# Patient Record
Sex: MALE | Race: WHITE | NOT HISPANIC OR LATINO | Employment: FULL TIME | URBAN - METROPOLITAN AREA
[De-identification: names, ages, dates, MRNs, and addresses within clinical notes are randomized per-mention and may not be internally consistent; named-entity substitution may affect disease eponyms.]

---

## 2019-03-01 ENCOUNTER — HOSPITAL ENCOUNTER (EMERGENCY)
Facility: HOSPITAL | Age: 64
Discharge: LEFT BEFORE DISCHARGE | End: 2019-03-01
Attending: EMERGENCY MEDICINE
Payer: COMMERCIAL

## 2019-03-01 VITALS
HEART RATE: 86 BPM | SYSTOLIC BLOOD PRESSURE: 122 MMHG | DIASTOLIC BLOOD PRESSURE: 74 MMHG | RESPIRATION RATE: 20 BRPM | TEMPERATURE: 98 F | OXYGEN SATURATION: 98 %

## 2019-03-01 DIAGNOSIS — E87.1 HYPONATREMIA: Primary | ICD-10-CM

## 2019-03-01 DIAGNOSIS — I95.9 HYPOTENSION: ICD-10-CM

## 2019-03-01 LAB
ANION GAP SERPL CALC-SCNC: 12 MMOL/L
BASOPHILS # BLD AUTO: 0.03 K/UL
BASOPHILS NFR BLD: 0.3 %
BUN SERPL-MCNC: 13 MG/DL
CALCIUM SERPL-MCNC: 10 MG/DL
CHLORIDE SERPL-SCNC: 91 MMOL/L
CO2 SERPL-SCNC: 22 MMOL/L
CREAT SERPL-MCNC: 1.1 MG/DL
DIFFERENTIAL METHOD: ABNORMAL
EOSINOPHIL # BLD AUTO: 0 K/UL
EOSINOPHIL NFR BLD: 0 %
ERYTHROCYTE [DISTWIDTH] IN BLOOD BY AUTOMATED COUNT: 14 %
EST. GFR  (AFRICAN AMERICAN): >60 ML/MIN/1.73 M^2
EST. GFR  (NON AFRICAN AMERICAN): >60 ML/MIN/1.73 M^2
GLUCOSE SERPL-MCNC: 103 MG/DL
HCT VFR BLD AUTO: 41 %
HGB BLD-MCNC: 14.4 G/DL
IMM GRANULOCYTES # BLD AUTO: 0.05 K/UL
IMM GRANULOCYTES NFR BLD AUTO: 0.4 %
LYMPHOCYTES # BLD AUTO: 0.6 K/UL
LYMPHOCYTES NFR BLD: 4.7 %
MCH RBC QN AUTO: 35.3 PG
MCHC RBC AUTO-ENTMCNC: 35.1 G/DL
MCV RBC AUTO: 101 FL
MONOCYTES # BLD AUTO: 0.8 K/UL
MONOCYTES NFR BLD: 7.1 %
NEUTROPHILS # BLD AUTO: 10.3 K/UL
NEUTROPHILS NFR BLD: 87.5 %
NRBC BLD-RTO: 0 /100 WBC
PLATELET # BLD AUTO: 316 K/UL
PMV BLD AUTO: 9.3 FL
POTASSIUM SERPL-SCNC: 4.4 MMOL/L
RBC # BLD AUTO: 4.08 M/UL
SODIUM SERPL-SCNC: 125 MMOL/L
TROPONIN I SERPL DL<=0.01 NG/ML-MCNC: <0.006 NG/ML
WBC # BLD AUTO: 11.81 K/UL

## 2019-03-01 PROCEDURE — 84484 ASSAY OF TROPONIN QUANT: CPT

## 2019-03-01 PROCEDURE — 99284 EMERGENCY DEPT VISIT MOD MDM: CPT | Mod: ,,, | Performed by: EMERGENCY MEDICINE

## 2019-03-01 PROCEDURE — 93010 ELECTROCARDIOGRAM REPORT: CPT | Mod: ,,, | Performed by: INTERNAL MEDICINE

## 2019-03-01 PROCEDURE — 93010 EKG 12-LEAD: ICD-10-PCS | Mod: ,,, | Performed by: INTERNAL MEDICINE

## 2019-03-01 PROCEDURE — 93005 ELECTROCARDIOGRAM TRACING: CPT

## 2019-03-01 PROCEDURE — 80048 BASIC METABOLIC PNL TOTAL CA: CPT

## 2019-03-01 PROCEDURE — 99284 PR EMERGENCY DEPT VISIT,LEVEL IV: ICD-10-PCS | Mod: ,,, | Performed by: EMERGENCY MEDICINE

## 2019-03-01 PROCEDURE — 99284 EMERGENCY DEPT VISIT MOD MDM: CPT

## 2019-03-01 PROCEDURE — 85025 COMPLETE CBC W/AUTO DIFF WBC: CPT

## 2019-03-01 PROCEDURE — 25000003 PHARM REV CODE 250: Performed by: PHYSICIAN ASSISTANT

## 2019-03-01 RX ORDER — SODIUM CHLORIDE 9 MG/ML
1000 INJECTION, SOLUTION INTRAVENOUS
Status: COMPLETED | OUTPATIENT
Start: 2019-03-01 | End: 2019-03-01

## 2019-03-01 RX ADMIN — SODIUM CHLORIDE 1000 ML: 0.9 INJECTION, SOLUTION INTRAVENOUS at 10:03

## 2019-03-01 NOTE — ED PROVIDER NOTES
Encounter Date: 3/1/2019       History     Chief Complaint   Patient presents with    Hypotension     64-year-old male with hypertension and tobacco use presents for hypotension.  Patient is visiting from [place], he was at the airport this morning flying back home when he started to feel fatigued and lightheaded.  Per paramedics, patient was diaphoretic and pale with blood pressure of 70/30.  He reports that symptoms improved after sitting down and now have completely resolved.  Denies ever having chest pain, shortness of breath, leg swelling or palpitations.  Denies recent fevers, abdominal pain, nausea/vomiting, diarrhea or urinary symptoms. States that he has not been sleeping well and did not eat or drink today which he suspects prompted his symptoms. No history of DVT/PE, no recent immobilization, long travel was 2 hour flight from [place].  Denies any known cardiac history, no history of prior similar episodes.          Review of patient's allergies indicates:  No Known Allergies  History reviewed. No pertinent past medical history.  History reviewed. No pertinent surgical history.  History reviewed. No pertinent family history.  Social History     Tobacco Use    Smoking status: Current Every Day Smoker     Packs/day: 1.00     Years: 20.00     Pack years: 20.00     Types: Cigarettes    Smokeless tobacco: Never Used   Substance Use Topics    Alcohol use: Yes     Alcohol/week: 1.8 oz     Types: 3 Cans of beer per week    Drug use: No     Review of Systems   Constitutional: Positive for diaphoresis and fatigue. Negative for chills and fever.   Eyes: Negative for photophobia and visual disturbance.   Respiratory: Negative for cough and shortness of breath.    Cardiovascular: Negative for chest pain, palpitations and leg swelling.   Gastrointestinal: Negative for abdominal pain, constipation, diarrhea, nausea and vomiting.   Endocrine: Negative for polydipsia and polyuria.   Genitourinary: Negative for dysuria  and hematuria.   Musculoskeletal: Negative for back pain and myalgias.   Skin: Positive for pallor. Negative for color change.   Neurological: Positive for light-headedness. Negative for dizziness, tremors, seizures, syncope, facial asymmetry, speech difficulty, weakness, numbness and headaches.       Physical Exam     Initial Vitals [03/01/19 0728]   BP Pulse Resp Temp SpO2   114/79 85 15 97.8 °F (36.6 °C) 98 %      MAP       --         Vitals:    03/01/19 1015   BP: 122/74   Pulse: 86   Resp: 20   Temp: 98       Physical Exam    Nursing note and vitals reviewed.  Constitutional: He appears well-developed and well-nourished. He is not diaphoretic. No distress.   HENT:   Head: Normocephalic and atraumatic.   Eyes: EOM are normal. Pupils are equal, round, and reactive to light.   Neck: Normal range of motion. Neck supple.   Cardiovascular: Normal rate, regular rhythm, normal heart sounds and intact distal pulses. Exam reveals no gallop and no friction rub.    No murmur heard.  Pulmonary/Chest: Breath sounds normal. No respiratory distress. He has no wheezes. He has no rhonchi. He has no rales. He exhibits no tenderness.   Abdominal: Soft. Bowel sounds are normal. He exhibits no distension and no mass. There is no tenderness. There is no rebound and no guarding.   Musculoskeletal: Normal range of motion.   Neurological: He is alert and oriented to person, place, and time.   Skin: Skin is warm and dry. Capillary refill takes more than 3 seconds.   Fingertips appear dusky and cyanotic   Psychiatric: He has a normal mood and affect.         ED Course   Procedures  Labs Reviewed   CBC W/ AUTO DIFFERENTIAL - Abnormal; Notable for the following components:       Result Value    RBC 4.08 (*)      (*)     MCH 35.3 (*)     Gran # (ANC) 10.3 (*)     Immature Grans (Abs) 0.05 (*)     Lymph # 0.6 (*)     Gran% 87.5 (*)     Lymph% 4.7 (*)     All other components within normal limits   BASIC METABOLIC PANEL - Abnormal;  Notable for the following components:    Sodium 125 (*)     Chloride 91 (*)     CO2 22 (*)     All other components within normal limits   TROPONIN I   TROPONIN I     EKG Readings: (Independently Interpreted)   Initial Reading: No STEMI. Rhythm: Normal Sinus Rhythm. Heart Rate: 87. Ectopy: No Ectopy. Conduction: Normal. ST Segments: Normal ST Segments. T Waves: Normal. Clinical Impression: Normal Sinus Rhythm     ECG Results          EKG 12-lead (In process)  Result time 03/01/19 08:08:23    In process by Interface, Lab In [facility] (03/01/19 08:08:23)                 Narrative:    Test Reason : (Not Selected)    Vent. Rate : 087 BPM     Atrial Rate : 087 BPM     P-R Int : 144 ms          QRS Dur : 090 ms      QT Int : 382 ms       P-R-T Axes : 066 077 070 degrees     QTc Int : 459 ms    Normal sinus rhythm  Normal ECG  No previous ECGs available    Referred By: System System           Confirmed By:                             Imaging Results    None          Medical Decision Making:   History:   Old Medical Records: I decided to obtain old medical records.  Independently Interpreted Test(s):   I have ordered and independently interpreted EKG Reading(s) - see prior notes  Clinical Tests:   Lab Tests: Reviewed and Ordered  Medical Tests: Ordered and Reviewed       APC / Resident Notes:   64-year-old male presenting for resolved hypotension.  Patient received approximately 300 mL of normal saline en route from EMS.  Differential diagnosis includes orthostatic hypotension, ACS, dehydration electrolyte derangement.  Will check labs, continue to give fluids and reassess.    Labs notable for hyponatremia with sodium of 125.  Discussed this result with the patient.  Given his electrolyte abnormality as well as high risk for ACS, discussed observation admission with the patient.  He refuses admission and would like to sign out AMA without having 2nd troponin checked.  Discussed at length with the patient about risks of  leaving AMA, including but not limited to MI, permanent organ damage, disability or death.  Patient expressed understanding and still would like to leave.  I believe he is of sound mind and has decision-making capacity.  AMA form signed. Stressed the importance of follow-up, strict ED return precautions given.  Patient voiced understanding. I discussed this patient with my supervising physician.    Clara Salvador PA-C             Attending Attestation:     Physician Attestation Statement for NP/PA:   I have conducted a face to face encounter with this patient in addition to the NP/PA, due to Medical Complexity    Other NP/PA Attestation Additions:    History of Present Illness: 65 y/o M reportly no medical problems presents from airport after near syncopal episode. SBP at seen per report 70s. Pt states he was on his way home after working all week in [place]. Pt states he barely slept because of noise in the hotel and he wok up at approx 4 to go to airport. No chest pain. No SOB. At time of exam asymptomatic. Denies significant etoh use.   Physical Exam: aao  nad  rrr  cta bialt  No le swelling   Medical Decision Making: ecg without ischemic changes.   Hydrate with IVF bolus  Plan of ACS rule out with serial tni  Sodium of 125, will therefore admit for rule out and correction of sodium- no prior blood work on pt as he is from [place].  Pt refusing admission. States he needs to get home. Pt aao and competent to make medical decisions. He expresses understanding risks of leaving AMA and chooses to leave AMA.                     Clinical Impression:       ICD-10-CM ICD-9-CM   1. Hyponatremia E87.1 276.1   2. Hypotension I95.9 458.9         Disposition:   Disposition: AMA  Condition: Stable        Clara Salvador PA-C  03/01/19 1056       Adriana Ray MD  03/01/19 1952

## 2019-03-01 NOTE — ED PROVIDER NOTES
Encounter Date: 3/1/2019       History     Chief Complaint   Patient presents with    Hypotension     HPI  Review of patient's allergies indicates:  No Known Allergies  History reviewed. No pertinent past medical history.  History reviewed. No pertinent surgical history.  History reviewed. No pertinent family history.  Social History     Tobacco Use    Smoking status: Current Every Day Smoker     Packs/day: 1.00     Years: 20.00     Pack years: 20.00     Types: Cigarettes    Smokeless tobacco: Never Used   Substance Use Topics    Alcohol use: Yes     Alcohol/week: 1.8 oz     Types: 3 Cans of beer per week    Drug use: No     Review of Systems    Physical Exam     Initial Vitals [03/01/19 0728]   BP Pulse Resp Temp SpO2   114/79 85 15 97.8 °F (36.6 °C) 98 %      MAP       --         Physical Exam    ED Course   Procedures  Labs Reviewed   CBC W/ AUTO DIFFERENTIAL - Abnormal; Notable for the following components:       Result Value    RBC 4.08 (*)      (*)     MCH 35.3 (*)     Gran # (ANC) 10.3 (*)     Immature Grans (Abs) 0.05 (*)     Lymph # 0.6 (*)     Gran% 87.5 (*)     Lymph% 4.7 (*)     All other components within normal limits   BASIC METABOLIC PANEL - Abnormal; Notable for the following components:    Sodium 125 (*)     Chloride 91 (*)     CO2 22 (*)     All other components within normal limits   TROPONIN I   TROPONIN I        ECG Results          EKG 12-lead (In process)  Result time 03/01/19 08:08:23    In process by Interface, Lab In [facility] (03/01/19 08:08:23)                 Narrative:    Test Reason : (Not Selected)    Vent. Rate : 087 BPM     Atrial Rate : 087 BPM     P-R Int : 144 ms          QRS Dur : 090 ms      QT Int : 382 ms       P-R-T Axes : 066 077 070 degrees     QTc Int : 459 ms    Normal sinus rhythm  Normal ECG  No previous ECGs available    Referred By: System System           Confirmed By:                             Imaging Results    None          Medical Decision Making:    History:   Old Medical Records: I decided to obtain old medical records.  Clinical Tests:   Lab Tests: Reviewed and Ordered  Medical Tests: Reviewed and Ordered                      Clinical Impression:       ICD-10-CM ICD-9-CM   1. Hyponatremia E87.1 276.1   2. Hypotension I95.9 458.9

## 2019-03-01 NOTE — ED TRIAGE NOTES
Pt arrived to EMS with CC of low blood pressure per EMS 70/36 on scene 1L of normal saline given and B/P improved 114/79. Denies CP. Pt reports SOB 4 L of oxygen applied via nasal cannula improvement of 89% to 100%.     Patient identifiers verified and correct for Bob Esquivel.    LOC: The patient is awake, alert and oriented x 4. Pt is speaking appropriately, no slurred speech.  APPEARANCE: Patient resting comfortably and in no acute distress. Pt is clean and well groomed. No JVD visible. Pt reports pain level of 0.  SKIN: Skin is warm dry and intact, and color is consistent with ethnicity. No tenting observed and capillary refill <3 seconds. No clubbing noted to nail beds. No breakdown or brusing visible and mucus membranes moist and acyanotic.  MUSCULOSKELETAL: Full range of motion present in all extremities. Hand  equal and leg strength strong +5 bilaterally.  RESPIRATORY: Airway is open and patent. Respirations-unlabored, regular rate, equal bilaterally on inspiration and expiration. No accessory muscle use noted. Lungs clear to auscultation in all fields bilaterally anterior and posterior.   CARDIAC: Patient has regular heart rate and rhythm.  No peripheral edema noted, and patient has no c/o chest pain.  ABDOMEN: Soft and non-tender to palpation with no distention noted. Normoactive bowel sounds X4 quadrants. Pt has no complaints of abnormal bowel movements. Pt reports normal appetite.   NEUROLOGIC: Eyes open spontaneously and facial expression symmetrical. Pt behavior appropriate to situation, and pt follows commands.  Pt reports sensation present in all extremities when touched with a finger. PERRLA  : No complaints of frequency, burning, urgency or blood in the urine.
